# Patient Record
Sex: FEMALE | Race: WHITE | NOT HISPANIC OR LATINO | Employment: FULL TIME | URBAN - METROPOLITAN AREA
[De-identification: names, ages, dates, MRNs, and addresses within clinical notes are randomized per-mention and may not be internally consistent; named-entity substitution may affect disease eponyms.]

---

## 2023-02-21 PROBLEM — R51.9 FREQUENT HEADACHES: Status: ACTIVE | Noted: 2023-02-21

## 2023-02-21 PROBLEM — L55.9 SUNBURN: Status: ACTIVE | Noted: 2023-02-21

## 2023-02-21 PROBLEM — H04.121 DRY EYE SYNDROME OF RIGHT LACRIMAL GLAND: Status: ACTIVE | Noted: 2023-02-21

## 2023-02-21 PROBLEM — R52 BODY ACHES: Status: ACTIVE | Noted: 2023-02-21

## 2023-02-21 PROBLEM — L03.90 CELLULITIS: Status: ACTIVE | Noted: 2023-02-21

## 2023-02-21 PROBLEM — J45.909 ASTHMA (HHS-HCC): Status: ACTIVE | Noted: 2023-02-21

## 2023-02-21 PROBLEM — L70.9 ACNE: Status: ACTIVE | Noted: 2023-02-21

## 2023-02-21 PROBLEM — R53.83 FATIGUE: Status: ACTIVE | Noted: 2023-02-21

## 2023-02-21 PROBLEM — H01.019 BLEPHARITIS, ULCERATIVE: Status: ACTIVE | Noted: 2023-02-21

## 2023-02-21 RX ORDER — ALBUTEROL SULFATE 90 UG/1
2 AEROSOL, METERED RESPIRATORY (INHALATION) EVERY 6 HOURS PRN
COMMUNITY
Start: 2015-12-12 | End: 2024-04-10 | Stop reason: SDUPTHER

## 2023-02-21 RX ORDER — NORGESTIMATE AND ETHINYL ESTRADIOL 7DAYSX3 28
1 KIT ORAL DAILY
COMMUNITY
Start: 2018-11-26 | End: 2023-11-14

## 2023-02-21 RX ORDER — BUDESONIDE AND FORMOTEROL FUMARATE DIHYDRATE 160; 4.5 UG/1; UG/1
AEROSOL RESPIRATORY (INHALATION) 2 TIMES DAILY
COMMUNITY
Start: 2017-03-10 | End: 2023-11-22 | Stop reason: ALTCHOICE

## 2023-04-03 ENCOUNTER — TELEMEDICINE (OUTPATIENT)
Dept: PRIMARY CARE | Facility: CLINIC | Age: 28
End: 2023-04-03
Payer: COMMERCIAL

## 2023-04-03 DIAGNOSIS — J45.40 MODERATE PERSISTENT ASTHMA WITHOUT COMPLICATION (HHS-HCC): ICD-10-CM

## 2023-04-03 DIAGNOSIS — F32.A FATIGUE DUE TO DEPRESSION: Primary | ICD-10-CM

## 2023-04-03 DIAGNOSIS — R53.83 FATIGUE DUE TO DEPRESSION: Primary | ICD-10-CM

## 2023-04-03 DIAGNOSIS — F41.9 ANXIETY: ICD-10-CM

## 2023-04-03 PROCEDURE — 99213 OFFICE O/P EST LOW 20 MIN: CPT | Performed by: FAMILY MEDICINE

## 2023-04-03 RX ORDER — BUPROPION HYDROCHLORIDE 150 MG/1
150 TABLET ORAL EVERY MORNING
Qty: 90 TABLET | Refills: 1 | Status: SHIPPED | OUTPATIENT
Start: 2023-04-03 | End: 2023-10-11 | Stop reason: SDUPTHER

## 2023-04-03 NOTE — PROGRESS NOTES
Subjective   Patient ID: Eloisa Berry is a 27 y.o. female who presents virtually for medication refill.    HPI   She reports that she has seen improvement. She does not need her rescue inhaler often. She does not use Symbicort Daily. No problems with asthma    She feels that her mood is managed on Bupropion. She continues to have more energy and less fatigue. Anxiety id controlled, depression s/s in remission.  Denies side effects    She denies any questions or concerns at this time.     Review of Systems    Objective   There were no vitals taken for this visit.this is VV    Physical Exam  Constitutional:       Appearance: Normal appearance.   Pulmonary:      Effort: Pulmonary effort is normal.   Neurological:      Mental Status: She is alert.   Psychiatric:         Mood and Affect: Mood normal.         Thought Content: Thought content normal.         Judgment: Judgment normal.         Assessment/Plan   Diagnoses and all orders for this visit:  Fatigue due to depression  -     buPROPion XL (Wellbutrin XL) 150 mg 24 hr tablet; Take 1 tablet (150 mg) by mouth once daily in the morning. Do not crush, chew, or split.  Moderate persistent asthma without complication  Anxiety  -     buPROPion XL (Wellbutrin XL) 150 mg 24 hr tablet; Take 1 tablet (150 mg) by mouth once daily in the morning. Do not crush, chew, or split.    Problem List Items Addressed This Visit          Respiratory    Asthma       Other    Fatigue - Primary    Relevant Medications    buPROPion XL (Wellbutrin XL) 150 mg 24 hr tablet    Anxiety    Relevant Medications    buPROPion XL (Wellbutrin XL) 150 mg 24 hr tablet     Depression and Anxiety  Continue on Bupropion daily. Prescription sent to pharmacy. She will f/up in 6 months or prn sooner  if s/s not in remission.     Scribe Attestation  By signing my name below, Trisha SNELL Scribe   attest that this documentation has been prepared under the direction and in the presence of Angeli  DO Jet.

## 2023-06-06 LAB
CORTISOL (UG/DL) IN SERUM - AM: 1.1 UG/DL (ref 5–20)
ESTIMATED AVERAGE GLUCOSE FOR HBA1C: 100 MG/DL
HEMOGLOBIN A1C/HEMOGLOBIN TOTAL IN BLOOD: 5.1 %
THYROTROPIN (MIU/L) IN SER/PLAS BY DETECTION LIMIT <= 0.05 MIU/L: 2.38 MIU/L (ref 0.44–3.98)
THYROXINE (T4) FREE (NG/DL) IN SER/PLAS: 1.03 NG/DL (ref 0.78–1.48)
TRIIODOTHYRONINE (T3) FREE (PG/ML) IN SER/PLAS: 3.6 PG/ML (ref 2.3–4.2)

## 2023-06-09 LAB — ADRENOCORTICOTROPIC HORMONE: 3.8 PG/ML (ref 7.2–63.3)

## 2023-06-10 LAB
IGF 1 (INSULIN-LIKE GROWTH FACTOR 1): 230 NG/ML (ref 96–301)
IGF 1 Z SCORE CALCULATION: 0.7

## 2023-06-17 LAB — DEXAMETHASONE: 389 NG/DL

## 2023-08-01 LAB
CORTISOL, SALIVA: 0.04 UG/DL
CORTISOL, SALIVA: 0.1 UG/DL

## 2023-09-14 ENCOUNTER — TELEPHONE (OUTPATIENT)
Dept: PRIMARY CARE | Facility: CLINIC | Age: 28
End: 2023-09-14
Payer: COMMERCIAL

## 2023-09-14 NOTE — TELEPHONE ENCOUNTER
Pt caitlynm requesting to schedule     Pt is scheduled. She asked if her appt can be a virtual?     She also need a short refill of bupropion 150mg take 1x a day sent to [facility] until her appt. Can she get a refill?

## 2023-09-18 ENCOUNTER — TELEPHONE (OUTPATIENT)
Dept: PRIMARY CARE | Facility: CLINIC | Age: 28
End: 2023-09-18
Payer: COMMERCIAL

## 2023-10-09 ENCOUNTER — APPOINTMENT (OUTPATIENT)
Dept: PRIMARY CARE | Facility: CLINIC | Age: 28
End: 2023-10-09
Payer: COMMERCIAL

## 2023-10-11 ENCOUNTER — OFFICE VISIT (OUTPATIENT)
Dept: PRIMARY CARE | Facility: CLINIC | Age: 28
End: 2023-10-11
Payer: COMMERCIAL

## 2023-10-11 VITALS
HEIGHT: 64 IN | DIASTOLIC BLOOD PRESSURE: 69 MMHG | BODY MASS INDEX: 34.66 KG/M2 | OXYGEN SATURATION: 100 % | SYSTOLIC BLOOD PRESSURE: 113 MMHG | TEMPERATURE: 98.4 F | HEART RATE: 73 BPM | WEIGHT: 203 LBS

## 2023-10-11 DIAGNOSIS — F32.A MILD DEPRESSION: ICD-10-CM

## 2023-10-11 DIAGNOSIS — F41.9 ANXIETY: Primary | ICD-10-CM

## 2023-10-11 DIAGNOSIS — R53.83 FATIGUE DUE TO DEPRESSION: ICD-10-CM

## 2023-10-11 DIAGNOSIS — F32.A FATIGUE DUE TO DEPRESSION: ICD-10-CM

## 2023-10-11 DIAGNOSIS — J45.40 MODERATE PERSISTENT ASTHMA WITHOUT COMPLICATION (HHS-HCC): ICD-10-CM

## 2023-10-11 PROCEDURE — 1036F TOBACCO NON-USER: CPT | Performed by: FAMILY MEDICINE

## 2023-10-11 PROCEDURE — 99214 OFFICE O/P EST MOD 30 MIN: CPT | Performed by: FAMILY MEDICINE

## 2023-10-11 RX ORDER — BUPROPION HYDROCHLORIDE 150 MG/1
150 TABLET ORAL EVERY MORNING
Qty: 90 TABLET | Refills: 1 | Status: SHIPPED | OUTPATIENT
Start: 2023-10-11 | End: 2024-04-10 | Stop reason: SDUPTHER

## 2023-10-11 RX ORDER — ALBUTEROL SULFATE 90 UG/1
2 AEROSOL, METERED RESPIRATORY (INHALATION) EVERY 6 HOURS PRN
Qty: 18 G | Status: CANCELLED | OUTPATIENT
Start: 2023-10-11

## 2023-10-11 NOTE — PROGRESS NOTES
"Subjective   Patient ID: Eloisa Berry is a 27 y.o. female who presents for Med Refill.    HPI pt is here for med refill.    Taking wellbutrin, doing well, anxiety and mood controlled.  Denies side effects.  Good energy level and sleeping well at night    Asthma is doing well.  Not a lot os allergy s/s.  She uses steroid mdi only when gets sick.  Has been on symbicort in the past    She usually keeps rescue mdi around but when last tried to get one it was several 100 dollars and did not get.  She will find out which albuterol mdi her ins covers better and will call and will send for her.  Also told her to try her mail order and can try good rx    She continues on OCP, sees gyn regularly and utd with pap    She saw Dr Morris for weight gain, she was concerned it was a hormaonal problem.  Work up neg, suggested intermittent fasting and lower carbs   Review of Systems    Objective   /69   Pulse 73   Temp 36.9 °C (98.4 °F)   Ht 1.626 m (5' 4\")   Wt 92.1 kg (203 lb)   SpO2 100%   BMI 34.84 kg/m²     Physical Exam  Cardiovascular:      Rate and Rhythm: Normal rate and regular rhythm.      Pulses: Normal pulses.      Heart sounds: Normal heart sounds.   Pulmonary:      Effort: Pulmonary effort is normal.      Breath sounds: Normal breath sounds.   Musculoskeletal:         General: Normal range of motion.      Cervical back: Normal range of motion.   Lymphadenopathy:      Cervical: No cervical adenopathy.   Skin:     General: Skin is warm and dry.   Neurological:      Mental Status: She is alert and oriented to person, place, and time.   Psychiatric:         Mood and Affect: Mood normal.         Behavior: Behavior normal.         Thought Content: Thought content normal.         Judgment: Judgment normal.         Assessment/Plan   Problem List Items Addressed This Visit             ICD-10-CM    Asthma J45.909    Fatigue R53.83    Relevant Medications    buPROPion XL (Wellbutrin XL) 150 mg 24 hr tablet    " Anxiety - Primary F41.9    Relevant Medications    buPROPion XL (Wellbutrin XL) 150 mg 24 hr tablet   Refilled wellutrin xl for 6 months and pt will f/up or see prn sooner    She will call with albuterol mdi her ins will cover and will send in for her    Due for cpe, she will schedule

## 2023-11-14 DIAGNOSIS — Z30.41 SURVEILLANCE FOR BIRTH CONTROL, ORAL CONTRACEPTIVES: Primary | ICD-10-CM

## 2023-11-14 RX ORDER — NORGESTIMATE AND ETHINYL ESTRADIOL 7DAYSX3 28
1 KIT ORAL DAILY
Qty: 28 TABLET | Refills: 0 | Status: SHIPPED | OUTPATIENT
Start: 2023-11-14 | End: 2024-01-02 | Stop reason: SDUPTHER

## 2023-11-14 NOTE — TELEPHONE ENCOUNTER
Last office visit: 12/22/2022  Next office visit: 12/28/2023  [facility]    Mildred Pickard MA

## 2023-11-22 ENCOUNTER — TELEPHONE (OUTPATIENT)
Dept: OBSTETRICS AND GYNECOLOGY | Facility: CLINIC | Age: 28
End: 2023-11-22
Payer: COMMERCIAL

## 2023-11-22 RX ORDER — BUDESONIDE AND FORMOTEROL FUMARATE DIHYDRATE 160; 4.5 UG/1; UG/1
2 AEROSOL RESPIRATORY (INHALATION) 2 TIMES DAILY
COMMUNITY
Start: 2017-03-10

## 2023-11-22 NOTE — TELEPHONE ENCOUNTER
Eloisa Berry called in to have birth control refilled only have one pack left.      Last Office Visit: 12/22/23   Next Office Visit: 1/2/24  Pharmacy: [facility]

## 2024-01-02 ENCOUNTER — OFFICE VISIT (OUTPATIENT)
Dept: OBSTETRICS AND GYNECOLOGY | Facility: CLINIC | Age: 29
End: 2024-01-02
Payer: COMMERCIAL

## 2024-01-02 VITALS
WEIGHT: 208 LBS | BODY MASS INDEX: 35.51 KG/M2 | SYSTOLIC BLOOD PRESSURE: 112 MMHG | HEIGHT: 64 IN | DIASTOLIC BLOOD PRESSURE: 62 MMHG

## 2024-01-02 DIAGNOSIS — Z01.419 WELL WOMAN EXAM WITH ROUTINE GYNECOLOGICAL EXAM: Primary | ICD-10-CM

## 2024-01-02 DIAGNOSIS — Z30.41 SURVEILLANCE FOR BIRTH CONTROL, ORAL CONTRACEPTIVES: ICD-10-CM

## 2024-01-02 PROCEDURE — 88175 CYTOPATH C/V AUTO FLUID REDO: CPT

## 2024-01-02 PROCEDURE — 99395 PREV VISIT EST AGE 18-39: CPT | Performed by: OBSTETRICS & GYNECOLOGY

## 2024-01-02 PROCEDURE — 1036F TOBACCO NON-USER: CPT | Performed by: OBSTETRICS & GYNECOLOGY

## 2024-01-02 RX ORDER — NORGESTIMATE AND ETHINYL ESTRADIOL 7DAYSX3 28
1 KIT ORAL DAILY
Qty: 84 TABLET | Refills: 4 | Status: SHIPPED | OUTPATIENT
Start: 2024-01-02

## 2024-01-02 NOTE — PROGRESS NOTES
"Eloisa Berry is a 28 y.o. female who is here for a routine exam.     Periods are regular every 28-30 days, lasting 4 days.       Sexually active: Oral contraceptives  Non-smoker with normal blood pressure    Regular self breast exam: yes  Now concerns on her exams    Menstrual History:  OB History          0    Para   0    Term   0       0    AB   0    Living   0         SAB   0    IAB   0    Ectopic   0    Multiple   0    Live Births   0                Patient's last menstrual period was 2023.         Review of Systems  All Normal Review of Systems  Constitutional: no fever, no chills, no recent weight gain, no recent weight loss and no fatigue.    Gastrointestinal: no abdominal pain, no constipation, no nausea, no diarrhea and no vomiting.    Genitourinary: no dysuria, no urinary incontinence, no vaginal dryness, no vaginal itching, no dyspareunia, no pelvic pain, no dysmenorrhea, no sexual problems, no change in urinary frequency, no vaginal discharge, no unexplained vaginal bleeding and no lesion/sore.    Breasts: No masses.  No nipple discharge.  No redness    Objective   /62   Ht 1.626 m (5' 4\")   Wt 94.3 kg (208 lb)   LMP 2023   BMI 35.70 kg/m²     OBGyn Exam   Physical Exam  Constitutional: Alert and in no acute distress. Well developed, well nourished.   Head and Face: Head and face: Normal.    Eyes: Normal external exam - nonicteric sclera, extraocular movements intact (EOMI) and no ptosis.   Ears, Nose, Mouth, and Throat: External inspection of ears and nose: Normal.    Neck: No neck asymmetry. Supple. Thyroid not enlarged and there were no palpable thyroid nodules.   Chest: Breasts: Normal appearance, no nipple discharge and no skin changes. Palpation of breasts and axillae: No palpable mass and no axillary lymphadenopathy.   Abdomen: Soft nontender; no abdominal mass palpated. No organomegaly. No hernias.     Genitourinary:   External genitalia: Normal. No inguinal " lymphadenopathy. Bartholin's Urethral and Skenes Glands: Normal. Urethra: Normal.    Bladder: Normal on palpation.   Vagina: Normal. No discharge  Cervix: Normal.    Uterus: Normal.    Right Adnexa/parametria: Normal.  Left Adnexa/parametria: Normal.    Inspection of Perianal Area: Normal.     Musculoskeletal: No joint swelling seen, normal movements of all extremities.   Skin: Normal skin color and pigmentation, normal skin turgor, and no rash.   Neurologic: Non-focal. Grossly intact.   Psychiatric: Alert and oriented x 3. Affect normal to patient baseline. Mood: Appropriate.      Assessment/Plan   1) Annual exam:  Pap with reflex HPV testing completed  Prescription to pharmacy for birth control for 1 year    Thank you again for your visit to our office today  Please follow-up as needed or in 1 year with your annual exam

## 2024-01-16 LAB
CYTOLOGY CMNT CVX/VAG CYTO-IMP: NORMAL
LAB AP HPV GENOTYPE QUESTION: YES
LAB AP HPV HR: NORMAL
LABORATORY COMMENT REPORT: NORMAL
LMP START DATE: NORMAL
PATH REPORT.TOTAL CANCER: NORMAL

## 2024-04-10 ENCOUNTER — OFFICE VISIT (OUTPATIENT)
Dept: PRIMARY CARE | Facility: CLINIC | Age: 29
End: 2024-04-10
Payer: COMMERCIAL

## 2024-04-10 VITALS
HEART RATE: 72 BPM | SYSTOLIC BLOOD PRESSURE: 117 MMHG | TEMPERATURE: 97.6 F | WEIGHT: 190 LBS | BODY MASS INDEX: 32.44 KG/M2 | OXYGEN SATURATION: 97 % | HEIGHT: 64 IN | RESPIRATION RATE: 16 BRPM | DIASTOLIC BLOOD PRESSURE: 79 MMHG

## 2024-04-10 DIAGNOSIS — F41.9 ANXIETY: ICD-10-CM

## 2024-04-10 DIAGNOSIS — J45.40 MODERATE PERSISTENT ASTHMA WITHOUT COMPLICATION (HHS-HCC): ICD-10-CM

## 2024-04-10 DIAGNOSIS — R53.83 FATIGUE DUE TO DEPRESSION: Primary | ICD-10-CM

## 2024-04-10 DIAGNOSIS — R63.5 WEIGHT GAIN: ICD-10-CM

## 2024-04-10 DIAGNOSIS — F32.A FATIGUE DUE TO DEPRESSION: Primary | ICD-10-CM

## 2024-04-10 PROCEDURE — 1036F TOBACCO NON-USER: CPT | Performed by: FAMILY MEDICINE

## 2024-04-10 PROCEDURE — 99214 OFFICE O/P EST MOD 30 MIN: CPT | Performed by: FAMILY MEDICINE

## 2024-04-10 RX ORDER — SEMAGLUTIDE 0.5 MG/.5ML
0.5 INJECTION, SOLUTION SUBCUTANEOUS
Start: 2024-04-14 | End: 2024-05-14

## 2024-04-10 RX ORDER — ALBUTEROL SULFATE 90 UG/1
2 AEROSOL, METERED RESPIRATORY (INHALATION) EVERY 6 HOURS PRN
Qty: 18 G | Refills: 1 | Status: SHIPPED | OUTPATIENT
Start: 2024-04-10

## 2024-04-10 RX ORDER — BUPROPION HYDROCHLORIDE 300 MG/1
300 TABLET ORAL EVERY MORNING
Qty: 90 TABLET | Refills: 1 | Status: SHIPPED | OUTPATIENT
Start: 2024-04-10 | End: 2024-10-07

## 2024-04-10 ASSESSMENT — PATIENT HEALTH QUESTIONNAIRE - PHQ9
1. LITTLE INTEREST OR PLEASURE IN DOING THINGS: NOT AT ALL
SUM OF ALL RESPONSES TO PHQ9 QUESTIONS 1 AND 2: 0
2. FEELING DOWN, DEPRESSED OR HOPELESS: NOT AT ALL

## 2024-04-10 ASSESSMENT — PAIN SCALES - GENERAL: PAINLEVEL: 0-NO PAIN

## 2024-04-10 NOTE — PROGRESS NOTES
"Subjective   Patient ID: Eloisa Berry is a 28 y.o. female who presents for med f/up.    HPI Pt is here for med follw up  She is currently taking a GLP 1 dosed like Ozempic through a weight loss clinic in [place]. She states that it is working for her. She is experiencing some nausea that is mild and not bothersome.  No other sice effects.  She weighed 203# at her Oct OV    She feels that Wellbutrin works in the moring but wears off in the afternoon and it did not use to be this way. This has been happening for about 4 months. Some days she is fatigued and will just want to lay down. It is not getting better with better weather.  She is still exercising.  She feels she is eating a better diet.  She sleeps well, wakes up refreshed, not a loud snorer    Asthma is controlled, she is not using symbiort right now,  she uses when she gets a respiratory infection.  Otherwise rare need for rescue mdi.  She thinks rescue may be  so will rf this tody    She is still on OCP.  Sees gyn regularly  Review of Systems    Objective   /79   Pulse 72   Temp 36.4 °C (97.6 °F)   Resp 16   Ht 1.626 m (5' 4\")   Wt 86.2 kg (190 lb)   SpO2 97%   BMI 32.61 kg/m²     Physical Exam  Constitutional:       Appearance: Normal appearance.   Neck:      Comments: No thyromegaly  Cardiovascular:      Rate and Rhythm: Normal rate and regular rhythm.      Pulses: Normal pulses.      Heart sounds: Normal heart sounds.   Pulmonary:      Effort: Pulmonary effort is normal.      Breath sounds: Normal breath sounds.   Musculoskeletal:         General: Normal range of motion.      Cervical back: Normal range of motion.      Right lower leg: No edema.      Left lower leg: No edema.   Lymphadenopathy:      Cervical: No cervical adenopathy.   Skin:     General: Skin is warm and dry.   Neurological:      Mental Status: She is alert and oriented to person, place, and time.   Psychiatric:         Mood and Affect: Mood normal.         " Thought Content: Thought content normal.         Judgment: Judgment normal.         Assessment/Plan   Problem List Items Addressed This Visit             ICD-10-CM    Asthma J45.909    Relevant Medications    albuterol 90 mcg/actuation inhaler    Fatigue - Primary R53.83    Relevant Medications    buPROPion XL (Wellbutrin XL) 300 mg 24 hr tablet    Anxiety F41.9    Relevant Medications    buPROPion XL (Wellbutrin XL) 300 mg 24 hr tablet     Other Visit Diagnoses         Codes    Weight gain     R63.5    Relevant Medications    semaglutide, weight loss, (Wegovy) 0.5 mg/0.5 mL pen injector (Start on 4/14/2024)          Refilled buproprion XL for 300 mg, increased dose to see if feels better w fatigue and depression etc.  SE profile reviewed.  She does have some constipation when started Wegovy and is managed and discussed may have more s/s since weollbutrin xl may cause same se.  Will increase fiber supplements and high fiber foods.  Gave her pritned script for this.  If does not tolerate she will call and can go back down on dose.      Will see her back 2-3 months if not improving, if doing well will see back 6 months    Refilled rescue mdi.  Discussed asthma control    Discussed diet and exercise to help her meet her goals    F/up 2-6 months planned    Discussed past  labs, will order again if not doing well   Scribe Attestation  By signing my name below, IAlyson , Scribe   attest that this documentation has been prepared under the direction and in the presence of Angeli Chaudhary DO.

## 2024-05-16 ENCOUNTER — LAB REQUISITION (OUTPATIENT)
Dept: LAB | Facility: HOSPITAL | Age: 29
End: 2024-05-16
Payer: COMMERCIAL

## 2024-05-16 DIAGNOSIS — R10.9 UNSPECIFIED ABDOMINAL PAIN: ICD-10-CM

## 2024-05-16 PROCEDURE — 87086 URINE CULTURE/COLONY COUNT: CPT

## 2024-05-18 LAB — BACTERIA UR CULT: NORMAL

## 2024-09-03 DIAGNOSIS — F32.A FATIGUE DUE TO DEPRESSION: ICD-10-CM

## 2024-09-03 DIAGNOSIS — R63.5 WEIGHT GAIN: ICD-10-CM

## 2024-09-03 DIAGNOSIS — R53.83 FATIGUE DUE TO DEPRESSION: ICD-10-CM

## 2024-09-03 DIAGNOSIS — F41.9 ANXIETY: ICD-10-CM

## 2024-09-03 RX ORDER — SEMAGLUTIDE 0.5 MG/.5ML
0.5 INJECTION, SOLUTION SUBCUTANEOUS
Qty: 6 ML | Refills: 0 | Status: CANCELLED | OUTPATIENT
Start: 2024-09-08 | End: 2024-12-01

## 2024-09-05 RX ORDER — BUPROPION HYDROCHLORIDE 300 MG/1
300 TABLET ORAL EVERY MORNING
Qty: 90 TABLET | Refills: 1 | Status: SHIPPED | OUTPATIENT
Start: 2024-09-05 | End: 2025-03-04

## 2024-10-09 ENCOUNTER — APPOINTMENT (OUTPATIENT)
Dept: PRIMARY CARE | Facility: CLINIC | Age: 29
End: 2024-10-09
Payer: COMMERCIAL

## 2024-10-09 VITALS
DIASTOLIC BLOOD PRESSURE: 74 MMHG | RESPIRATION RATE: 16 BRPM | BODY MASS INDEX: 27.14 KG/M2 | HEIGHT: 64 IN | HEART RATE: 92 BPM | TEMPERATURE: 97.4 F | SYSTOLIC BLOOD PRESSURE: 106 MMHG | WEIGHT: 159 LBS | OXYGEN SATURATION: 98 %

## 2024-10-09 DIAGNOSIS — F41.9 ANXIETY: ICD-10-CM

## 2024-10-09 DIAGNOSIS — J45.40 MODERATE PERSISTENT ASTHMA WITHOUT COMPLICATION (HHS-HCC): ICD-10-CM

## 2024-10-09 DIAGNOSIS — F32.A MILD DEPRESSION: Primary | ICD-10-CM

## 2024-10-09 PROCEDURE — 3008F BODY MASS INDEX DOCD: CPT | Performed by: FAMILY MEDICINE

## 2024-10-09 PROCEDURE — 99214 OFFICE O/P EST MOD 30 MIN: CPT | Performed by: FAMILY MEDICINE

## 2024-10-09 PROCEDURE — 1036F TOBACCO NON-USER: CPT | Performed by: FAMILY MEDICINE

## 2024-10-09 RX ORDER — ESCITALOPRAM OXALATE 5 MG/1
5 TABLET ORAL DAILY
Qty: 30 TABLET | Refills: 2 | Status: SHIPPED | OUTPATIENT
Start: 2024-10-09 | End: 2025-01-07

## 2024-10-09 ASSESSMENT — PAIN SCALES - GENERAL: PAINLEVEL: 0-NO PAIN

## 2024-10-09 ASSESSMENT — PATIENT HEALTH QUESTIONNAIRE - PHQ9
1. LITTLE INTEREST OR PLEASURE IN DOING THINGS: NOT AT ALL
2. FEELING DOWN, DEPRESSED OR HOPELESS: NOT AT ALL
SUM OF ALL RESPONSES TO PHQ9 QUESTIONS 1 AND 2: 0

## 2024-10-09 NOTE — PROGRESS NOTES
Subjective   Patient ID: Eloisa Berry is a 28 y.o. female who presents for 6 month f/up.    HPI   The patient presents to the clinic for a 6-month follow-up. She has past medical history of anxiety, frequent headaches, asthma, and acne.    The patient continues on Symbicort 160-4.5 mcg/actuation inhaler (2 puffs 2 times daily) and albuterol 90 mcg/acutation inhaler (2 puffs every 6 hours PRN) for treatment of asthma. Her asthma has been controlled with the use of these inhalers.     She takes bupropion  mg daily for treatment of depression/anxiety. The patient recalls that her bupropion XL medication was increased from 150 mg to 300 mg during her last clinic visit (04/10/2024) to better control depression/anxiety symptoms. Despite this dose increase, she believes that her depression/anxiety symptoms have not been controlled, she was feeling much better when first started medication for months and then seemed like quit working. She reports lack of motivation (inability to get off the couch after work also does not want to be social and go out, stays home etc). She does not believe that this depression/anxiety is situation-dependent. Notably, her depression/anxiety had been controlled on bupropion XL medication previously. She does not know if she has a different generic version of bupropion XL medication. She denies any side-effects to bupropion XL medication.    She reports that she is now in the process of weaning down on Wegovy injections for weight loss (current weight: 159 lbs, down 31 lbs from 04/10/2024) under the supervision of a weight loss clinic. She has 2 more doses of Wegovy 0.25 mg injections before discontinuing use of this medication. She will then attempt to maintain her weight using diet/exercise.    No problems w asthma, breathing has been fine, does not need rf symbicort    Her most recent gynecological exam was done in January 2024.    Review of Systems    Objective   /74   Pulse  "92   Temp 36.3 °C (97.4 °F)   Resp 16   Ht 1.626 m (5' 4\")   Wt 72.1 kg (159 lb)   SpO2 98%   BMI 27.29 kg/m²     Physical Exam  Constitutional:       Appearance: Normal appearance.   Cardiovascular:      Rate and Rhythm: Normal rate and regular rhythm.      Pulses: Normal pulses.      Heart sounds: Normal heart sounds.   Pulmonary:      Effort: Pulmonary effort is normal.      Breath sounds: Normal breath sounds.   Musculoskeletal:         General: Normal range of motion.      Cervical back: Normal range of motion.   Skin:     General: Skin is warm and dry.   Neurological:      Mental Status: She is alert and oriented to person, place, and time.   Psychiatric:         Mood and Affect: Mood normal.         Behavior: Behavior normal.         Thought Content: Thought content normal.         Judgment: Judgment normal.         Assessment/Plan   Problem List Items Addressed This Visit             ICD-10-CM    Asthma J45.909    Anxiety F41.9     Other Visit Diagnoses         Codes    Mild depression    -  Primary F32.A    Relevant Medications    escitalopram (Lexapro) 5 mg tablet                 In regards to concerns with her depression/anxiety, the patient was informed of the advantages and disadvantages (including side-effects) to Lexapro medication. After discussion, the patient received a prescription for Lexapro 5 mg medication. She was instructed to take 1 tablet (5 mg) of Lexapro by mouth once daily in the morning. Continue monitoring for potential side-effects to Lexapro medication. In addition, aamir was also advised to continue taking bupropion  mg daily in the morning. She will be re-evaluated during her next clinic visit to determine if her depression/anxiety symptoms are controlled on this new medication regimen. She was instructed to continue monitoring depression/anxiety symptoms for improvement/exacerbation.    Can see if different generic in the past through pharmacy    She will follow-up in " ~2-3 months to reassess, unless otherwise needed.    Scribe Attestation  By signing my name below, I, Addison Mcdermott , Scribshantanu   attest that this documentation has been prepared under the direction and in the presence of Angeli Chaudhary DO.

## 2024-12-16 DIAGNOSIS — F32.A MILD DEPRESSION: ICD-10-CM

## 2024-12-18 RX ORDER — ESCITALOPRAM OXALATE 5 MG/1
5 TABLET ORAL DAILY
Qty: 30 TABLET | Refills: 0 | Status: SHIPPED | OUTPATIENT
Start: 2024-12-18

## 2025-01-02 ENCOUNTER — APPOINTMENT (OUTPATIENT)
Dept: OBSTETRICS AND GYNECOLOGY | Facility: CLINIC | Age: 30
End: 2025-01-02
Payer: COMMERCIAL

## 2025-01-02 VITALS
WEIGHT: 163 LBS | HEIGHT: 64 IN | BODY MASS INDEX: 27.83 KG/M2 | SYSTOLIC BLOOD PRESSURE: 104 MMHG | DIASTOLIC BLOOD PRESSURE: 70 MMHG

## 2025-01-02 DIAGNOSIS — Z01.419 WELL WOMAN EXAM WITH ROUTINE GYNECOLOGICAL EXAM: Primary | ICD-10-CM

## 2025-01-02 DIAGNOSIS — Z30.41 SURVEILLANCE FOR BIRTH CONTROL, ORAL CONTRACEPTIVES: ICD-10-CM

## 2025-01-02 PROCEDURE — 3008F BODY MASS INDEX DOCD: CPT | Performed by: OBSTETRICS & GYNECOLOGY

## 2025-01-02 PROCEDURE — 99395 PREV VISIT EST AGE 18-39: CPT | Performed by: OBSTETRICS & GYNECOLOGY

## 2025-01-02 PROCEDURE — 1036F TOBACCO NON-USER: CPT | Performed by: OBSTETRICS & GYNECOLOGY

## 2025-01-02 RX ORDER — NORGESTIMATE AND ETHINYL ESTRADIOL 7DAYSX3 28
1 KIT ORAL DAILY
Qty: 84 TABLET | Refills: 4 | Status: SHIPPED | OUTPATIENT
Start: 2025-01-02

## 2025-01-02 SDOH — ECONOMIC STABILITY: TRANSPORTATION INSECURITY
IN THE PAST 12 MONTHS, HAS THE LACK OF TRANSPORTATION KEPT YOU FROM MEDICAL APPOINTMENTS OR FROM GETTING MEDICATIONS?: NO

## 2025-01-02 SDOH — ECONOMIC STABILITY: INCOME INSECURITY: IN THE LAST 12 MONTHS, WAS THERE A TIME WHEN YOU WERE NOT ABLE TO PAY THE MORTGAGE OR RENT ON TIME?: NO

## 2025-01-02 SDOH — ECONOMIC STABILITY: FOOD INSECURITY: WITHIN THE PAST 12 MONTHS, THE FOOD YOU BOUGHT JUST DIDN'T LAST AND YOU DIDN'T HAVE MONEY TO GET MORE.: NEVER TRUE

## 2025-01-02 SDOH — ECONOMIC STABILITY: FOOD INSECURITY: WITHIN THE PAST 12 MONTHS, YOU WORRIED THAT YOUR FOOD WOULD RUN OUT BEFORE YOU GOT MONEY TO BUY MORE.: NEVER TRUE

## 2025-01-02 SDOH — ECONOMIC STABILITY: TRANSPORTATION INSECURITY
IN THE PAST 12 MONTHS, HAS LACK OF TRANSPORTATION KEPT YOU FROM MEETINGS, WORK, OR FROM GETTING THINGS NEEDED FOR DAILY LIVING?: NO

## 2025-01-02 ASSESSMENT — LIFESTYLE VARIABLES
HOW OFTEN DO YOU HAVE A DRINK CONTAINING ALCOHOL: 2-4 TIMES A MONTH
SKIP TO QUESTIONS 9-10: 0
HOW MANY STANDARD DRINKS CONTAINING ALCOHOL DO YOU HAVE ON A TYPICAL DAY: 3 OR 4
AUDIT-C TOTAL SCORE: 3
HOW OFTEN DO YOU HAVE SIX OR MORE DRINKS ON ONE OCCASION: NEVER

## 2025-01-02 ASSESSMENT — SOCIAL DETERMINANTS OF HEALTH (SDOH)
WITHIN THE LAST YEAR, HAVE YOU BEEN KICKED, HIT, SLAPPED, OR OTHERWISE PHYSICALLY HURT BY YOUR PARTNER OR EX-PARTNER?: NO
WITHIN THE LAST YEAR, HAVE TO BEEN RAPED OR FORCED TO HAVE ANY KIND OF SEXUAL ACTIVITY BY YOUR PARTNER OR EX-PARTNER?: NO
HOW HARD IS IT FOR YOU TO PAY FOR THE VERY BASICS LIKE FOOD, HOUSING, MEDICAL CARE, AND HEATING?: NOT HARD AT ALL
WITHIN THE LAST YEAR, HAVE YOU BEEN HUMILIATED OR EMOTIONALLY ABUSED IN OTHER WAYS BY YOUR PARTNER OR EX-PARTNER?: NO

## 2025-01-02 ASSESSMENT — PATIENT HEALTH QUESTIONNAIRE - PHQ9
2. FEELING DOWN, DEPRESSED OR HOPELESS: NOT AT ALL
1. LITTLE INTEREST OR PLEASURE IN DOING THINGS: NOT AT ALL
SUM OF ALL RESPONSES TO PHQ9 QUESTIONS 1 AND 2: 0

## 2025-01-02 NOTE — PROGRESS NOTES
"Eloisa Berry is a 29 y.o. female who is here for a annual exam.     Periods are regular every 28-30 days, lasting 2-3  days.       Sexually active: Oral contraceptives    Regular self breast exam: yes  no concerns on her exams    Menstrual History:  OB History          0    Para   0    Term   0       0    AB   0    Living   0         SAB   0    IAB   0    Ectopic   0    Multiple   0    Live Births   0                Patient's last menstrual period was 2024.         Review of Systems  All Normal Review of Systems  Constitutional: no fever, no chills, no recent weight gain, no recent weight loss and no fatigue.    Gastrointestinal: no abdominal pain, no constipation, no nausea, no diarrhea and no vomiting.    Genitourinary: no dysuria, no urinary incontinence, no vaginal dryness, no vaginal itching, no dyspareunia, no pelvic pain, no dysmenorrhea, no sexual problems, no change in urinary frequency, no vaginal discharge, no unexplained vaginal bleeding and no lesion/sore.    Breasts: No masses.  No nipple discharge.  No redness    Objective   /70   Ht 1.626 m (5' 4\")   Wt 73.9 kg (163 lb)   LMP 2024   BMI 27.98 kg/m²     OBGyn Exam   Physical Exam  Constitutional: Alert and in no acute distress. Well developed, well nourished.   Head and Face: Head and face: Normal.    Eyes: Normal external exam - nonicteric sclera, extraocular movements intact (EOMI) and no ptosis.   Ears, Nose, Mouth, and Throat: External inspection of ears and nose: Normal.    Neck: No neck asymmetry. Supple. Thyroid not enlarged and there were no palpable thyroid nodules.   Chest: Breasts: Normal appearance, no nipple discharge and no skin changes. Palpation of breasts and axillae: No palpable mass and no axillary lymphadenopathy.   Abdomen: Soft nontender; no abdominal mass palpated. No organomegaly. No hernias.     Genitourinary:   External genitalia: Normal. No inguinal lymphadenopathy. Bartholin's Urethral " and Skenes Glands: Normal. Urethra: Normal.    Bladder: Normal on palpation.   Vagina: Normal. No discharge  Cervix: Normal.    Uterus: Normal.    Right Adnexa/parametria: Normal.  Left Adnexa/parametria: Normal.    Inspection of Perianal Area: Normal.     Musculoskeletal: No joint swelling seen, normal movements of all extremities.   Skin: Normal skin color and pigmentation, normal skin turgor, and no rash.   Neurologic: Non-focal. Grossly intact.   Psychiatric: Alert and oriented x 3. Affect normal to patient baseline. Mood: Appropriate.      Assessment/Plan   1) Annual exam:  Pap with reflex HPV testing completed  Refill to pharmacy for pill for 1 year    Thank you again for your visit to our office today  Please follow-up as needed or in 1 year with your annual exam

## 2025-01-10 LAB
CYTOLOGY CMNT CVX/VAG CYTO-IMP: NORMAL
LAB AP HPV GENOTYPE QUESTION: YES
LAB AP HPV HR: NORMAL
LABORATORY COMMENT REPORT: NORMAL
PATH REPORT.TOTAL CANCER: NORMAL

## 2025-01-15 ENCOUNTER — APPOINTMENT (OUTPATIENT)
Dept: PRIMARY CARE | Facility: CLINIC | Age: 30
End: 2025-01-15
Payer: COMMERCIAL

## 2025-02-01 DIAGNOSIS — F32.A MILD DEPRESSION: ICD-10-CM

## 2025-02-03 RX ORDER — ESCITALOPRAM OXALATE 5 MG/1
5 TABLET ORAL DAILY
Qty: 90 TABLET | Refills: 0 | Status: SHIPPED | OUTPATIENT
Start: 2025-02-03

## 2025-02-27 DIAGNOSIS — Z30.41 SURVEILLANCE FOR BIRTH CONTROL, ORAL CONTRACEPTIVES: ICD-10-CM

## 2025-02-28 RX ORDER — NORGESTIMATE AND ETHINYL ESTRADIOL 7DAYSX3 28
KIT ORAL
Refills: 0 | OUTPATIENT
Start: 2025-02-28

## 2025-03-17 DIAGNOSIS — R53.83 FATIGUE DUE TO DEPRESSION: ICD-10-CM

## 2025-03-17 DIAGNOSIS — F32.A FATIGUE DUE TO DEPRESSION: ICD-10-CM

## 2025-03-17 DIAGNOSIS — F41.9 ANXIETY: ICD-10-CM

## 2025-03-19 NOTE — TELEPHONE ENCOUNTER
Follow-up visit: 4/30/25  Last visit: 10/9/24  Last labs: 5/13/22 (AST 24, ALT 23, Scr 360y)  Last vitals: 1/2/25 (/70)  Depression screening: 10/9/24    Refill request from [facility] pharmacy for the following:   - buPROPion XL 300mg QAM (just filled on 3/8//24 at [facility] for 24 days)  Approved: 90 days until follow-up    LVM for pt to see where she wants Rx to be sent ([facility] vs [facility])

## 2025-03-24 RX ORDER — BUPROPION HYDROCHLORIDE 300 MG/1
300 TABLET ORAL EVERY MORNING
Qty: 90 TABLET | Refills: 0 | Status: SHIPPED | OUTPATIENT
Start: 2025-03-24

## 2025-04-17 DIAGNOSIS — J45.40 MODERATE PERSISTENT ASTHMA WITHOUT COMPLICATION (HHS-HCC): ICD-10-CM

## 2025-04-17 RX ORDER — ALBUTEROL SULFATE 90 UG/1
INHALANT RESPIRATORY (INHALATION)
Qty: 17 G | Refills: 4 | Status: SHIPPED | OUTPATIENT
Start: 2025-04-17

## 2025-04-30 ENCOUNTER — APPOINTMENT (OUTPATIENT)
Dept: PRIMARY CARE | Facility: CLINIC | Age: 30
End: 2025-04-30
Payer: COMMERCIAL

## 2025-04-30 VITALS
WEIGHT: 184 LBS | DIASTOLIC BLOOD PRESSURE: 81 MMHG | HEIGHT: 64 IN | SYSTOLIC BLOOD PRESSURE: 118 MMHG | BODY MASS INDEX: 31.41 KG/M2 | TEMPERATURE: 98.1 F | HEART RATE: 80 BPM | OXYGEN SATURATION: 97 %

## 2025-04-30 DIAGNOSIS — F32.A FATIGUE DUE TO DEPRESSION: ICD-10-CM

## 2025-04-30 DIAGNOSIS — J45.40 MODERATE PERSISTENT ASTHMA WITHOUT COMPLICATION (HHS-HCC): ICD-10-CM

## 2025-04-30 DIAGNOSIS — R53.83 FATIGUE DUE TO DEPRESSION: ICD-10-CM

## 2025-04-30 DIAGNOSIS — F41.9 ANXIETY: ICD-10-CM

## 2025-04-30 DIAGNOSIS — F32.A MILD DEPRESSION: ICD-10-CM

## 2025-04-30 PROBLEM — R63.5 WEIGHT GAIN: Status: ACTIVE | Noted: 2025-04-30

## 2025-04-30 PROBLEM — L03.019 CELLULITIS OF FINGER: Status: ACTIVE | Noted: 2017-04-06

## 2025-04-30 PROBLEM — B37.31 CANDIDIASIS OF VAGINA: Status: ACTIVE | Noted: 2025-04-30

## 2025-04-30 PROBLEM — R50.9 FEVER: Status: ACTIVE | Noted: 2025-04-30

## 2025-04-30 PROBLEM — J20.9 ACUTE BRONCHITIS: Status: ACTIVE | Noted: 2025-04-30

## 2025-04-30 PROCEDURE — 1036F TOBACCO NON-USER: CPT | Performed by: FAMILY MEDICINE

## 2025-04-30 PROCEDURE — 3008F BODY MASS INDEX DOCD: CPT | Performed by: FAMILY MEDICINE

## 2025-04-30 PROCEDURE — 99214 OFFICE O/P EST MOD 30 MIN: CPT | Performed by: FAMILY MEDICINE

## 2025-04-30 RX ORDER — ESCITALOPRAM OXALATE 5 MG/1
5 TABLET ORAL DAILY
Qty: 90 TABLET | Refills: 1 | Status: SHIPPED | OUTPATIENT
Start: 2025-04-30

## 2025-04-30 RX ORDER — BUPROPION HYDROCHLORIDE 300 MG/1
300 TABLET ORAL EVERY MORNING
Qty: 90 TABLET | Refills: 1 | Status: SHIPPED | OUTPATIENT
Start: 2025-04-30

## 2025-04-30 NOTE — PROGRESS NOTES
"Subjective   Patient ID: Eloisa Berry is a 29 y.o. female who presents for 3 MONTH FUV.    HPI   The patient presents to the clinic for a 6-month follow-up. She has past medical history of anxiety, frequent headaches, asthma, and acne.    She takes bupropion  mg daily for treatment of depression/anxiety. Previously discussed lowering her medication as she has also started Lexapro 5mg, but she would like to hold off on decreasing her Wellbutrin dose for now. She has issues going on at work that she is worried about causing her to want to stay on the medication. Since starting the Lexapro, she reports decreased anxiety overall, and is sleeping better. She denies any side-effects to bupropion XL, or Lexapro medication.  She feels well on this combination of meds at present doses.  Will continue    She reports that she has discontinued Wegovy.  She is continuing to try to maintain her weight using diet/exercise.    The patient continues on Symbicort 160-4.5 mcg/actuation inhaler (2 puffs 2 times daily) and albuterol 90 mcg/acutation inhaler (2 puffs every 6 hours PRN) for treatment of asthma. Her asthma has been controlled with the use of these inhalers. No need for refills at this time. She does not have to use all the time. More with cold in winter and if intense exercise.  She does more walking for exercise now    Pt is on OCP from GYN        No other concerns today.     Review of Systems   All other systems reviewed and are negative.      Objective   /81   Pulse 80   Temp 36.7 °C (98.1 °F)   Ht 1.632 m (5' 4.25\")   Wt 83.5 kg (184 lb)   SpO2 97%   BMI 31.34 kg/m²     Physical Exam  Vitals reviewed.   HENT:      Right Ear: Tympanic membrane and ear canal normal.      Left Ear: Tympanic membrane and ear canal normal.      Nose: Nose normal.      Mouth/Throat:      Pharynx: Oropharynx is clear.   Eyes:      Pupils: Pupils are equal, round, and reactive to light.   Cardiovascular:      Rate and " Rhythm: Normal rate and regular rhythm.      Heart sounds: Normal heart sounds.   Pulmonary:      Effort: Pulmonary effort is normal.      Breath sounds: Normal breath sounds.   Musculoskeletal:         General: Normal range of motion.      Cervical back: Normal range of motion.   Lymphadenopathy:      Cervical: No cervical adenopathy.   Skin:     General: Skin is warm and dry.   Neurological:      Mental Status: She is alert and oriented to person, place, and time.   Psychiatric:         Mood and Affect: Mood normal.         Behavior: Behavior normal.         Thought Content: Thought content normal.         Judgment: Judgment normal.         Assessment/Plan   Diagnoses and all orders for this visit:  Moderate persistent asthma without complication ([facility])  Mild depression  -     escitalopram (Lexapro) 5 mg tablet; Take 1 tablet (5 mg) by mouth once daily.  Anxiety  -     buPROPion XL (Wellbutrin XL) 300 mg 24 hr tablet; Take 1 tablet (300 mg) by mouth once daily in the morning.  Fatigue due to depression  -     buPROPion XL (Wellbutrin XL) 300 mg 24 hr tablet; Take 1 tablet (300 mg) by mouth once daily in the morning.    In regards to concerns with her depression/anxiety, patient states she is doing okay at this time, but would like to stay on her current medication regimen. Advised to continue taking bupropion  mg daily in the morning, and Lexapro 5mg. She was instructed to continue monitoring depression/anxiety symptoms for improvement/exacerbation. Medications were refilled today for 90 days.     No refills for her inhalers today. She has not needed to use, will call if this changes    Last labs good range, will repeat at cpe at age 30    She will follow-up in 6 months, unless otherwise needed.    Scribe Attestation  By signing my name below, IJackelyn Scribe   attest that this documentation has been prepared under the direction and in the presence of Angeli Chaudhary DO.

## 2025-04-30 NOTE — ASSESSMENT & PLAN NOTE
Stable, no acute exacerbations.  Has rescue mdi to use prn and Symbicort when needed, usually with cold or intense activity she has to restart Symbicort.  She states asthma much less severe than in the past

## 2025-08-18 ENCOUNTER — TELEPHONE (OUTPATIENT)
Dept: PRIMARY CARE | Facility: CLINIC | Age: 30
End: 2025-08-18
Payer: COMMERCIAL

## 2025-10-30 ENCOUNTER — APPOINTMENT (OUTPATIENT)
Dept: PRIMARY CARE | Facility: CLINIC | Age: 30
End: 2025-10-30
Payer: COMMERCIAL

## 2026-01-05 ENCOUNTER — APPOINTMENT (OUTPATIENT)
Dept: OBSTETRICS AND GYNECOLOGY | Facility: CLINIC | Age: 31
End: 2026-01-05
Payer: COMMERCIAL

## 2026-04-30 ENCOUNTER — APPOINTMENT (OUTPATIENT)
Dept: PRIMARY CARE | Facility: CLINIC | Age: 31
End: 2026-04-30
Payer: COMMERCIAL